# Patient Record
Sex: MALE
[De-identification: names, ages, dates, MRNs, and addresses within clinical notes are randomized per-mention and may not be internally consistent; named-entity substitution may affect disease eponyms.]

---

## 2018-10-08 ENCOUNTER — HOSPITAL ENCOUNTER (EMERGENCY)
Dept: HOSPITAL 5 - ED | Age: 28
Discharge: HOME | End: 2018-10-08
Payer: COMMERCIAL

## 2018-10-08 VITALS — SYSTOLIC BLOOD PRESSURE: 140 MMHG | DIASTOLIC BLOOD PRESSURE: 62 MMHG

## 2018-10-08 DIAGNOSIS — K31.84: Primary | ICD-10-CM

## 2018-10-08 DIAGNOSIS — Z87.442: ICD-10-CM

## 2018-10-08 DIAGNOSIS — F17.200: ICD-10-CM

## 2018-10-08 DIAGNOSIS — N39.0: ICD-10-CM

## 2018-10-08 DIAGNOSIS — E11.9: ICD-10-CM

## 2018-10-08 LAB
ALBUMIN SERPL-MCNC: 4.1 G/DL (ref 3.9–5)
ALT SERPL-CCNC: 7 UNITS/L (ref 7–56)
BACTERIA #/AREA URNS HPF: (no result) /HPF
BASOPHILS # (AUTO): 0.1 K/MM3 (ref 0–0.1)
BASOPHILS NFR BLD AUTO: 0.9 % (ref 0–1.8)
BILIRUB UR QL STRIP: (no result)
BLOOD UR QL VISUAL: (no result)
BUN SERPL-MCNC: 8 MG/DL (ref 9–20)
BUN/CREAT SERPL: 11 %
CALCIUM SERPL-MCNC: 9.7 MG/DL (ref 8.4–10.2)
EOSINOPHIL # BLD AUTO: 0 K/MM3 (ref 0–0.4)
EOSINOPHIL NFR BLD AUTO: 0.4 % (ref 0–4.3)
HCT VFR BLD CALC: 39 % (ref 35.5–45.6)
HEMOLYSIS INDEX: 6
HGB BLD-MCNC: 13.5 GM/DL (ref 11.8–15.2)
LIPASE SERPL-CCNC: 18 UNITS/L (ref 13–60)
LYMPHOCYTES # BLD AUTO: 3.7 K/MM3 (ref 1.2–5.4)
LYMPHOCYTES NFR BLD AUTO: 30.2 % (ref 13.4–35)
MCH RBC QN AUTO: 31 PG (ref 28–32)
MCHC RBC AUTO-ENTMCNC: 35 % (ref 32–34)
MCV RBC AUTO: 90 FL (ref 84–94)
MONOCYTES # (AUTO): 0.5 K/MM3 (ref 0–0.8)
MONOCYTES % (AUTO): 4 % (ref 0–7.3)
MUCOUS THREADS #/AREA URNS HPF: (no result) /HPF
PH UR STRIP: 6 [PH] (ref 5–7)
PLATELET # BLD: 343 K/MM3 (ref 140–440)
RBC # BLD AUTO: 4.36 M/MM3 (ref 3.65–5.03)
RBC #/AREA URNS HPF: 53 /HPF (ref 0–6)
UROBILINOGEN UR-MCNC: 2 MG/DL (ref ?–2)
WBC #/AREA URNS HPF: > 182 /HPF (ref 0–6)

## 2018-10-08 PROCEDURE — 82150 ASSAY OF AMYLASE: CPT

## 2018-10-08 PROCEDURE — 96361 HYDRATE IV INFUSION ADD-ON: CPT

## 2018-10-08 PROCEDURE — 96372 THER/PROPH/DIAG INJ SC/IM: CPT

## 2018-10-08 PROCEDURE — 99284 EMERGENCY DEPT VISIT MOD MDM: CPT

## 2018-10-08 PROCEDURE — 80053 COMPREHEN METABOLIC PANEL: CPT

## 2018-10-08 PROCEDURE — 96375 TX/PRO/DX INJ NEW DRUG ADDON: CPT

## 2018-10-08 PROCEDURE — 96374 THER/PROPH/DIAG INJ IV PUSH: CPT

## 2018-10-08 PROCEDURE — 83690 ASSAY OF LIPASE: CPT

## 2018-10-08 PROCEDURE — 85025 COMPLETE CBC W/AUTO DIFF WBC: CPT

## 2018-10-08 PROCEDURE — 81001 URINALYSIS AUTO W/SCOPE: CPT

## 2018-10-08 PROCEDURE — 74176 CT ABD & PELVIS W/O CONTRAST: CPT

## 2018-10-08 PROCEDURE — 36415 COLL VENOUS BLD VENIPUNCTURE: CPT

## 2018-10-08 NOTE — CAT SCAN REPORT
FINAL REPORT



EXAM:  CT ABDOMEN PELVIS WO CON



HISTORY:  abdominal pain 



TECHNIQUE:  Spiral CT scanning of the abdomen and pelvis. No oral

or IV contrast administered. Multiplanar reformations. 



PRIORS:  None.



FINDINGS:  

Abdomen: 



Examination limited due to lack of contrast administration.



Visualized lung bases grossly unremarkable.  



No radiopaque gallstones. 



Liver grossly unremarkable. 



Spleen grossly unremarkable. 



Pancreas grossly unremarkable. 



Left double-J ureteral stent extending from left renal pelvis to

urinary bladder, and mild dilatation of left renal collecting

system without apparent renal calcifications. Punctate, possible

papillary calcification noted in the right kidney without

significant hydronephrosis. 



Adrenal glands grossly unremarkable. 



Pelvis: 



Bowel grossly unremarkable, with moderate amount of retained

stool. 



Appendix within normal limits. 



No significant free peritoneal fluid or loculated fluid

collection. 



Abdominal aorta non-aneurysmal. 



Apparent partial calcification of bilateral vas deferens. 



Axial skeleton grossly unremarkable. 



IMPRESSION:  

1. Mild left hydronephrosis and indwelling left ureteral stent. 



2. Punctate, nonobstructing right renal calcification. No

significant right hydronephrosis.

## 2018-10-08 NOTE — EMERGENCY DEPARTMENT REPORT
ED General Adult HPI





- General


Chief complaint: Abdominal Pain


Stated complaint: N/V


Time Seen by Provider: 10/08/18 15:44


Source: patient


Mode of arrival: Ambulatory


Limitations: No Limitations





- History of Present Illness


Initial comments: 


Patient presents to emergency department with chief complaint of nausea 

vomiting and abdominal pain.  Patient states she has a history of gastroparesis 

and states his having a flareup currently.  Patient also states that 3 weeks 

ago he had a renal stent placed for a 9 mm stone and was supposed to have it 

removed but he was arrested and has a follow-up with urology.  Patient has no 

other complaints.  He denies chest pain, shortness of breath, headache.





-: Sudden


Location: abdomen


Radiation: non-radiation


Severity scale (0 -10): 6


Quality: aching, sharp


Consistency: constant


Improves with: none


Worsens with: none


Associated Symptoms: denies other symptoms


Treatments Prior to Arrival: none





- Related Data


 Previous Rx's











 Medication  Instructions  Recorded  Last Taken  Type


 


Ketorolac [Toradol] 10 mg PO Q8H PRN #12 tablet 10/08/18 Unknown Rx


 


Metoclopramide [Reglan] 10 mg PO TID #21 tab 10/08/18 Unknown Rx


 


Ondansetron [Zofran Odt] 4 mg PO Q8HR #12 tab.rapdis 10/08/18 Unknown Rx


 


levoFLOXacin [Levaquin] 750 mg PO QDAY #5 tablet 10/08/18 Unknown Rx











 Allergies











Allergy/AdvReac Type Severity Reaction Status Date / Time


 


No Known Allergies Allergy   Verified 10/08/18 15:51














ED Review of Systems


ROS: 


Stated complaint: N/V


Other details as noted in HPI





Comment: All other systems reviewed and negative


Constitutional: denies: chills, fever


Eyes: denies: eye pain, eye discharge, vision change


ENT: denies: ear pain, throat pain


Respiratory: denies: cough, shortness of breath, wheezing


Cardiovascular: denies: chest pain, palpitations


Endocrine: no symptoms reported


Gastrointestinal: abdominal pain, nausea, vomiting.  denies: diarrhea


Genitourinary: denies: urgency, dysuria


Musculoskeletal: denies: back pain, joint swelling, arthralgia


Skin: denies: rash, lesions


Neurological: denies: headache, weakness, paresthesias


Psychiatric: denies: anxiety, depression


Hematological/Lymphatic: denies: easy bleeding, easy bruising





ED Past Medical Hx





- Past Medical History


Hx Diabetes: Yes


Hx Kidney Stones: Yes


Additional medical history: gastroporsis





- Surgical History


Past Surgical History?: No





- Social History


Smoking Status: Current Every Day Smoker





- Medications


Home Medications: 


 Home Medications











 Medication  Instructions  Recorded  Confirmed  Last Taken  Type


 


Ketorolac [Toradol] 10 mg PO Q8H PRN #12 tablet 10/08/18  Unknown Rx


 


Metoclopramide [Reglan] 10 mg PO TID #21 tab 10/08/18  Unknown Rx


 


Ondansetron [Zofran Odt] 4 mg PO Q8HR #12 tab.rapdis 10/08/18  Unknown Rx


 


levoFLOXacin [Levaquin] 750 mg PO QDAY #5 tablet 10/08/18  Unknown Rx














ED Physical Exam





- General


Limitations: No Limitations


General appearance: alert, in no apparent distress





- Head


Head exam: Present: atraumatic, normocephalic





- Eye


Eye exam: Present: normal appearance, PERRL, EOMI





- ENT


ENT exam: Present: other (dry mucous membranes)





- Neck


Neck exam: Present: normal inspection





- Respiratory


Respiratory exam: Present: normal lung sounds bilaterally.  Absent: respiratory 

distress, wheezes, rales, rhonchi





- Cardiovascular


Cardiovascular Exam: Present: normal rhythm, other (tachycardic).  Absent: 

systolic murmur, diastolic murmur, rubs, gallop





- GI/Abdominal


GI/Abdominal exam: Present: soft, tenderness (mildly diffusely tender throughout

), normal bowel sounds.  Absent: distended





- Rectal


Rectal exam: Present: deferred





- Extremities Exam


Extremities exam: Present: normal inspection





- Back Exam


Back exam: Present: normal inspection





- Neurological Exam


Neurological exam: Present: alert, oriented X3, CN II-XII intact.  Absent: 

motor sensory deficit





- Psychiatric


Psychiatric exam: Present: normal affect, normal mood





- Skin


Skin exam: Present: warm, dry, intact, normal color.  Absent: rash





ED Course


 Vital Signs











  10/08/18 10/08/18





  15:32 15:37


 


Temperature 98.8 F 


 


Pulse Rate 104 H 


 


Respiratory 18 18





Rate  


 


Blood Pressure 147/98 


 


O2 Sat by Pulse 96 





Oximetry  














ED Medical Decision Making





- Lab Data


Result diagrams: 


 10/08/18 15:47





 10/08/18 15:47








 Lab Results











  10/08/18 10/08/18 10/08/18 Range/Units





  15:47 15:47 18:13 


 


WBC  12.2 H    (4.5-11.0)  K/mm3


 


RBC  4.36    (3.65-5.03)  M/mm3


 


Hgb  13.5    (11.8-15.2)  gm/dl


 


Hct  39.0    (35.5-45.6)  %


 


MCV  90    (84-94)  fl


 


MCH  31    (28-32)  pg


 


MCHC  35 H    (32-34)  %


 


RDW  12.3 L    (13.2-15.2)  %


 


Plt Count  343    (140-440)  K/mm3


 


Lymph % (Auto)  30.2    (13.4-35.0)  %


 


Mono % (Auto)  4.0    (0.0-7.3)  %


 


Eos % (Auto)  0.4    (0.0-4.3)  %


 


Baso % (Auto)  0.9    (0.0-1.8)  %


 


Lymph #  3.7    (1.2-5.4)  K/mm3


 


Mono #  0.5    (0.0-0.8)  K/mm3


 


Eos #  0.0    (0.0-0.4)  K/mm3


 


Baso #  0.1    (0.0-0.1)  K/mm3


 


Seg Neutrophils %  64.5    (40.0-70.0)  %


 


Seg Neutrophils #  7.9 H    (1.8-7.7)  K/mm3


 


Sodium   142   (137-145)  mmol/L


 


Potassium   3.6   (3.6-5.0)  mmol/L


 


Chloride   99.6   ()  mmol/L


 


Carbon Dioxide   23   (22-30)  mmol/L


 


Anion Gap   23   mmol/L


 


BUN   8 L   (9-20)  mg/dL


 


Creatinine   0.7 L   (0.8-1.5)  mg/dL


 


Estimated GFR   > 60   ml/min


 


BUN/Creatinine Ratio   11   %


 


Glucose   147 H   ()  mg/dL


 


Calcium   9.7   (8.4-10.2)  mg/dL


 


Total Bilirubin   0.80   (0.1-1.2)  mg/dL


 


AST   12   (5-40)  units/L


 


ALT   7   (7-56)  units/L


 


Alkaline Phosphatase   68   ()  units/L


 


Total Protein   8.6 H   (6.3-8.2)  g/dL


 


Albumin   4.1   (3.9-5)  g/dL


 


Albumin/Globulin Ratio   0.9   %


 


Amylase   42   ()  units/L


 


Lipase   18   (13-60)  units/L


 


Urine Color    Yellow  (Yellow)  


 


Urine Turbidity    Cloudy  (Clear)  


 


Urine pH    6.0  (5.0-7.0)  


 


Ur Specific Gravity    1.018  (1.003-1.030)  


 


Urine Protein    100 mg/dl  (Negative)  mg/dL


 


Urine Glucose (UA)    Neg  (Negative)  mg/dL


 


Urine Ketones    80  (Negative)  mg/dL


 


Urine Blood    Lg  (Negative)  


 


Urine Nitrite    Neg  (Negative)  


 


Urine Bilirubin    Neg  (Negative)  


 


Urine Urobilinogen    2.0  (<2.0)  mg/dL


 


Ur Leukocyte Esterase    Lg  (Negative)  


 


Urine WBC (Auto)    > 182.0 H  (0.0-6.0)  /HPF


 


Urine RBC (Auto)    53.0  (0.0-6.0)  /HPF


 


U Epithel Cells (Auto)    3.0  (0-13.0)  /HPF


 


Urine Bacteria (Auto)    1+  (Negative)  /HPF


 


Urine Mucus    3+  /HPF














- Radiology Data











Referring Physician: YSABEL ROCHA


 


Patient Name: YOSEPH BROWER


 


Patient ID: [id number]


 


YOB: 1990


 


Sex: Male


 


Accession: [id number]


 


Report Date: 2018-10-08


 


Report Status: Finalized








[place] [address] 

Cat Scan Report Signed Patient: YOSEPH BROWER MR#: [id number] : 1990 Acct:[id number] Age/Sex: 27 / M ADM Date: 10/08/18 Loc: ED Attending Dr: 

Ordering Physician: YSABEL ROCHA MD Date of Service: 10/08/18 Procedure(s): 

CT abdomen pelvis wo con Accession Number(s): [id number] cc: YSABEL ROCHA MD 

FINAL REPORT EXAM: CT ABDOMEN PELVIS WO CON HISTORY: abdominal pain TECHNIQUE: 

Spiral CT scanning of the abdomen and pelvis. No oral or IV contrast 

administered. Multiplanar reformations. PRIORS: None. FINDINGS: Abdomen: 

Examination limited due to lack of contrast administration. Visualized lung 

bases grossly unremarkable. No radiopaque gallstones. Liver grossly 

unremarkable. Spleen grossly unremarkable. Pancreas grossly unremarkable. Left 

double-J ureteral stent extending from left renal pelvis to urinary bladder, 

and mild dilatation of left renal collecting system without apparent renal 

calcifications. Punctate, possible papillary calcification noted in the right 

kidney without significant hydronephrosis. Adrenal glands grossly unremarkable. 

Pelvis: Bowel grossly unremarkable, with moderate amount of retained stool. 

Appendix within normal limits. No significant free peritoneal fluid or 

loculated fluid collection. Abdominal aorta non-aneurysmal. Apparent partial 

calcification of bilateral vas deferens. Axial skeleton grossly unremarkable. 

IMPRESSION: 1. Mild left hydronephrosis and indwelling left ureteral stent. 2. 

Punctate, nonobstructing right renal calcification. No significant right 

hydronephrosis. Transcribed By: [facility] Dictated By: MAVIS FANG MD 

Electronically Authenticated By: MAVIS FANG MD Signed Date/Time: 10/08/

18 1807 DD/DT: 10/08/18 1807 TD/TT: 10/08/18 1807 








- Medical Decision Making





Discussed results with the patient


Critical care attestation.: 


If time is entered above; I have spent that time in minutes in the direct care 

of this critically ill patient, excluding procedure time.








ED Disposition


Clinical Impression: 


 Gastroparesis, UTI (urinary tract infection)





Disposition: - TO HOME OR SELFCARE


Is pt being admited?: No


Does the pt Need Aspirin: No


Condition: Stable


Instructions:  Acute Nausea and Vomiting (ED), Urinary Tract Infection in Men (

ED)


Additional Instructions: 


return if worse


Prescriptions: 


Ketorolac [Toradol] 10 mg PO Q8H PRN #12 tablet


 PRN Reason: Pain


levoFLOXacin [Levaquin] 750 mg PO QDAY #5 tablet


Metoclopramide [Reglan] 10 mg PO TID #21 tab


Ondansetron [Zofran Odt] 4 mg PO Q8HR #12 tab.korydis


Referrals: 


PRIMARY CARE,MD [Primary Care Provider] - 3-5 Days


RENO PRAJAPATI MD [Staff Physician] - 3-5 Days


LUCIAN BELLO MD [Staff Physician] - 3-5 Days


Time of Disposition: 19:00

## 2021-03-29 ENCOUNTER — OUT OF OFFICE VISIT (OUTPATIENT)
Dept: URBAN - METROPOLITAN AREA MEDICAL CENTER 12 | Facility: MEDICAL CENTER | Age: 31
End: 2021-03-29
Payer: COMMERCIAL

## 2021-03-29 DIAGNOSIS — K20.80 ESOPHAGITIS, LOS ANGELES GRADE A: ICD-10-CM

## 2021-03-29 DIAGNOSIS — R10.13 ABDOMINAL DISCOMFORT, EPIGASTRIC: ICD-10-CM

## 2021-03-29 DIAGNOSIS — R11.2 ACUTE NAUSEA WITH NONBILIOUS VOMITING: ICD-10-CM

## 2021-03-29 DIAGNOSIS — R10.84 ABDOMINAL CRAMPING, GENERALIZED: ICD-10-CM

## 2021-03-29 PROCEDURE — 99244 OFF/OP CNSLTJ NEW/EST MOD 40: CPT | Performed by: INTERNAL MEDICINE

## 2021-03-29 PROCEDURE — 43239 EGD BIOPSY SINGLE/MULTIPLE: CPT | Performed by: INTERNAL MEDICINE

## 2022-12-16 ENCOUNTER — CLAIMS CREATED FROM THE CLAIM WINDOW (OUTPATIENT)
Dept: URBAN - METROPOLITAN AREA MEDICAL CENTER 9 | Facility: MEDICAL CENTER | Age: 32
End: 2022-12-16
Payer: COMMERCIAL

## 2022-12-16 DIAGNOSIS — R10.13 EPIGASTRIC PAIN: ICD-10-CM

## 2022-12-16 DIAGNOSIS — R10.84 GENERALIZED ABDOMINAL PAIN: ICD-10-CM

## 2022-12-16 DIAGNOSIS — K29.50 UNSPECIFIED CHRONIC GASTRITIS WITHOUT BLEEDING: ICD-10-CM

## 2022-12-16 DIAGNOSIS — R07.89 OTHER CHEST PAIN: ICD-10-CM

## 2022-12-16 DIAGNOSIS — K29.60 ADENOPAPILLOMATOSIS GASTRICA: ICD-10-CM

## 2022-12-16 DIAGNOSIS — R11.2 ACUTE NAUSEA WITH NONBILIOUS VOMITING: ICD-10-CM

## 2022-12-16 PROCEDURE — 99254 IP/OBS CNSLTJ NEW/EST MOD 60: CPT | Performed by: INTERNAL MEDICINE

## 2022-12-18 ENCOUNTER — CLAIMS CREATED FROM THE CLAIM WINDOW (OUTPATIENT)
Dept: URBAN - METROPOLITAN AREA MEDICAL CENTER 9 | Facility: MEDICAL CENTER | Age: 32
End: 2022-12-18
Payer: COMMERCIAL

## 2022-12-18 DIAGNOSIS — R10.84 GENERALIZED ABDOMINAL PAIN: ICD-10-CM

## 2022-12-18 DIAGNOSIS — R10.13 EPIGASTRIC PAIN: ICD-10-CM

## 2022-12-18 DIAGNOSIS — K29.50 UNSPECIFIED CHRONIC GASTRITIS WITHOUT BLEEDING: ICD-10-CM

## 2022-12-18 DIAGNOSIS — R07.89 OTHER CHEST PAIN: ICD-10-CM

## 2022-12-18 PROCEDURE — 99232 SBSQ HOSP IP/OBS MODERATE 35: CPT | Performed by: INTERNAL MEDICINE

## 2023-12-25 ENCOUNTER — P2P PATIENT RECORD (OUTPATIENT)
Age: 33
End: 2023-12-25

## 2024-01-22 ENCOUNTER — OFFICE VISIT (OUTPATIENT)
Dept: URBAN - METROPOLITAN AREA CLINIC 40 | Facility: CLINIC | Age: 34
End: 2024-01-22
Payer: COMMERCIAL

## 2024-01-22 VITALS
TEMPERATURE: 96.3 F | DIASTOLIC BLOOD PRESSURE: 88 MMHG | BODY MASS INDEX: 28.49 KG/M2 | WEIGHT: 215 LBS | HEART RATE: 61 BPM | SYSTOLIC BLOOD PRESSURE: 126 MMHG | HEIGHT: 73 IN

## 2024-01-22 DIAGNOSIS — R10.13 EPIGASTRIC ABDOMINAL PAIN: ICD-10-CM

## 2024-01-22 DIAGNOSIS — R11.2 ACUTE NAUSEA WITH NONBILIOUS VOMITING: ICD-10-CM

## 2024-01-22 DIAGNOSIS — F12.20 MARIJUANA DEPENDENCE: ICD-10-CM

## 2024-01-22 DIAGNOSIS — R11.0 NAUSEA: ICD-10-CM

## 2024-01-22 PROCEDURE — 99204 OFFICE O/P NEW MOD 45 MIN: CPT | Performed by: INTERNAL MEDICINE

## 2024-01-22 RX ORDER — POTASSIUM CHLORIDE 750 MG/1
TAKE 1 TABLET BY MOUTH ONCE DAILY TABLET, EXTENDED RELEASE ORAL
Qty: 10 EACH | Refills: 0 | Status: ACTIVE | COMMUNITY

## 2024-01-22 RX ORDER — ROSUVASTATIN CALCIUM 20 MG/1
TAKE 1 TABLET BY MOUTH ONCE DAILY AT BEDTIME TABLET, FILM COATED ORAL
Qty: 30 EACH | Refills: 0 | Status: ACTIVE | COMMUNITY

## 2024-01-22 RX ORDER — PANTOPRAZOLE 40 MG/1
TAKE 1 TABLET BY MOUTH ONCE DAILY TABLET, DELAYED RELEASE ORAL
Qty: 30 EACH | Refills: 0 | Status: ACTIVE | COMMUNITY

## 2024-01-22 RX ORDER — INSULIN HUMAN 100 [IU]/ML
INJECT 8 UNITS SUBCUTANEOUSLY TWICE DAILY BEFORE MEAL(S) INJECTION, SUSPENSION SUBCUTANEOUS
Qty: 3 MILLILITER | Refills: 0 | Status: ACTIVE | COMMUNITY

## 2024-01-22 RX ORDER — PROMETHAZINE HYDROCHLORIDE 12.5 MG/1
TAKE 1 TABLET BY MOUTH EVERY 6 HOURS AS NEEDED FOR NAUSEA TABLET ORAL
Qty: 30 EACH | Refills: 0 | Status: ACTIVE | COMMUNITY

## 2024-01-22 NOTE — HPI-TODAY'S VISIT:
Patient presents with his significant other for second opinion and evaluation of abdominal pain nausea vomiting.He has a known history of THC hyperemesis syndrome diagnosed previously.  However he does not believe that that is the underlying etiology of his symptoms.  I do see from my chart, [facility], and care everywhere that he has been seen multiple times in ERs around the city.  He has been seen by my colleagues as well at various times in the hospital setting. EGD 2021 showed severe LA grade C esophagitis diagnosed by Dr. Dyer, biopsies were negative for infection. EGD 2020 by Dr. Hobson showed LA grade a esophagitis. Gastric emptying test years ago was normal. He smokes marijuana moderately daily for most of his life. In his words he vomits all the time, severe epigastric abdominal pain, no alleviating or aggravating symptoms except food does make it worse.  He has a hard time working.  He is very interested in disability and feels that he has to drop out of the workforce because of his vomiting and abdominal pain.  Bowel movements are otherwise essentially normal.  He had no insurance for years but now he is on the insurance from his significant other and would like evaluation. Protonix has not helped in the past and caused headaches.  He stopped recently. Scopolamine has not helped. Zofran does not seem to help much.  He does not know if he has tried Phenergan. He is now vomiting daily, significant abdominal pain.  He continues to smoke moderate marijuana moderately daily.  No alcohol use and no additional illicit drug use.  Weight is stable and otherwise he has a good appetite.

## 2024-01-23 ENCOUNTER — LAB OUTSIDE AN ENCOUNTER (OUTPATIENT)
Dept: URBAN - METROPOLITAN AREA CLINIC 40 | Facility: CLINIC | Age: 34
End: 2024-01-23

## 2024-01-23 PROBLEM — 235595009: Status: ACTIVE | Noted: 2024-01-23

## 2024-01-23 PROBLEM — 85005007: Status: ACTIVE | Noted: 2024-01-23

## 2024-02-27 NOTE — PHYSICAL EXAM SKIN:
no rashes , no jaundice present , good turgor , no masses , no tenderness on palpation , no rashes , no jaundice present , good turgor , no masses , no tenderness on palpation
No

## 2024-03-19 ENCOUNTER — EGD (OUTPATIENT)
Dept: URBAN - METROPOLITAN AREA SURGERY CENTER 30 | Facility: SURGERY CENTER | Age: 34
End: 2024-03-19

## 2024-04-17 ENCOUNTER — OV EP (OUTPATIENT)
Dept: URBAN - METROPOLITAN AREA CLINIC 40 | Facility: CLINIC | Age: 34
End: 2024-04-17

## 2024-04-17 RX ORDER — ROSUVASTATIN CALCIUM 20 MG/1
TAKE 1 TABLET BY MOUTH ONCE DAILY AT BEDTIME TABLET, FILM COATED ORAL
Qty: 30 EACH | Refills: 0 | Status: ACTIVE | COMMUNITY

## 2024-04-17 RX ORDER — PANTOPRAZOLE 40 MG/1
TAKE 1 TABLET BY MOUTH ONCE DAILY TABLET, DELAYED RELEASE ORAL
Qty: 30 EACH | Refills: 0 | Status: ACTIVE | COMMUNITY

## 2024-04-17 RX ORDER — INSULIN HUMAN 100 [IU]/ML
INJECT 8 UNITS SUBCUTANEOUSLY TWICE DAILY BEFORE MEAL(S) INJECTION, SUSPENSION SUBCUTANEOUS
Qty: 3 MILLILITER | Refills: 0 | Status: ACTIVE | COMMUNITY

## 2024-04-17 RX ORDER — POTASSIUM CHLORIDE 750 MG/1
TAKE 1 TABLET BY MOUTH ONCE DAILY TABLET, EXTENDED RELEASE ORAL
Qty: 10 EACH | Refills: 0 | Status: ACTIVE | COMMUNITY

## 2024-04-17 RX ORDER — PROMETHAZINE HYDROCHLORIDE 12.5 MG/1
TAKE 1 TABLET BY MOUTH EVERY 6 HOURS AS NEEDED FOR NAUSEA TABLET ORAL
Qty: 30 EACH | Refills: 0 | Status: ACTIVE | COMMUNITY

## 2024-04-17 NOTE — HPI-TODAY'S VISIT:
33-year-old male patient with past medical history as listed below, seen by Dr. Bob January 22, 2024.  Visit details as below.   He presented with his significant other for second opinion and evaluation of abdominal pain nausea vomiting.He has a known history of THC hyperemesis syndrome diagnosed previously. However he does not believe that that is the underlying etiology of his symptoms. I do see from my chart, [facility], and care everywhere that he has been seen multiple times in ERs around the city. He has been seen by my colleagues as well at various times in the hospital setting. EGD 2021 showed severe LA grade C esophagitis diagnosed by Dr. Dyer, biopsies were negative for infection. EGD 2020 by Dr. Hobson showed LA grade a esophagitis. Gastric emptying test years ago was normal. He smokes marijuana moderately daily for most of his life. In his words he vomits all the time, severe epigastric abdominal pain, no alleviating or aggravating symptoms except food does make it worse. He has a hard time working. He is very interested in disability and feels that he has to drop out of the workforce because of his vomiting and abdominal pain. Bowel movements are otherwise essentially normal. He had no insurance for years but now he is on the insurance from his significant other and would like evaluation. Protonix has not helped in the past and caused headaches. He stopped recently. Scopolamine has not helped. Zofran does not seem to help much. He does not know if he has tried Phenergan. He is now vomiting daily, significant abdominal pain. He continues to smoke moderate marijuana moderately daily. No alcohol use and no additional illicit drug use. Weight is stable and otherwise he has a good appetite.   He was advised to stop marijuana and all THC use and sell all accessories in the house.  Gastric emptying study was ordered as well as EGD and prescriptions for GI meds sent.  Patient no showed EGD March 19, 2024.   Appears a gastric emptying study was not done.